# Patient Record
Sex: FEMALE | Race: WHITE | Employment: OTHER | URBAN - METROPOLITAN AREA
[De-identification: names, ages, dates, MRNs, and addresses within clinical notes are randomized per-mention and may not be internally consistent; named-entity substitution may affect disease eponyms.]

---

## 2022-02-10 ENCOUNTER — APPOINTMENT (OUTPATIENT)
Dept: GENERAL RADIOLOGY | Facility: CLINIC | Age: 67
DRG: 948 | End: 2022-02-10
Attending: EMERGENCY MEDICINE
Payer: MEDICARE

## 2022-02-10 ENCOUNTER — HOSPITAL ENCOUNTER (INPATIENT)
Facility: CLINIC | Age: 67
LOS: 1 days | Discharge: HOME HEALTH | DRG: 948 | End: 2022-02-12
Attending: EMERGENCY MEDICINE | Admitting: HOSPITALIST
Payer: MEDICARE

## 2022-02-10 DIAGNOSIS — S42.221A CLOSED 2-PART DISPLACED FRACTURE OF SURGICAL NECK OF RIGHT HUMERUS, INITIAL ENCOUNTER: ICD-10-CM

## 2022-02-10 DIAGNOSIS — S42.201A CLOSED FRACTURE OF PROXIMAL END OF RIGHT HUMERUS, UNSPECIFIED FRACTURE MORPHOLOGY, INITIAL ENCOUNTER: ICD-10-CM

## 2022-02-10 LAB
ANION GAP SERPL CALCULATED.3IONS-SCNC: 5 MMOL/L (ref 3–14)
BASOPHILS # BLD AUTO: 0 10E3/UL (ref 0–0.2)
BASOPHILS NFR BLD AUTO: 0 %
BUN SERPL-MCNC: 21 MG/DL (ref 7–30)
CALCIUM SERPL-MCNC: 8.6 MG/DL (ref 8.5–10.1)
CHLORIDE BLD-SCNC: 110 MMOL/L (ref 94–109)
CO2 SERPL-SCNC: 22 MMOL/L (ref 20–32)
CREAT SERPL-MCNC: 0.66 MG/DL (ref 0.52–1.04)
EOSINOPHIL # BLD AUTO: 0 10E3/UL (ref 0–0.7)
EOSINOPHIL NFR BLD AUTO: 0 %
ERYTHROCYTE [DISTWIDTH] IN BLOOD BY AUTOMATED COUNT: 17.2 % (ref 10–15)
GFR SERPL CREATININE-BSD FRML MDRD: >90 ML/MIN/1.73M2
GLUCOSE BLD-MCNC: 114 MG/DL (ref 70–99)
HCT VFR BLD AUTO: 42.7 % (ref 35–47)
HGB BLD-MCNC: 13.6 G/DL (ref 11.7–15.7)
IMM GRANULOCYTES # BLD: 0.1 10E3/UL
IMM GRANULOCYTES NFR BLD: 0 %
LYMPHOCYTES # BLD AUTO: 2.3 10E3/UL (ref 0.8–5.3)
LYMPHOCYTES NFR BLD AUTO: 20 %
MCH RBC QN AUTO: 27.8 PG (ref 26.5–33)
MCHC RBC AUTO-ENTMCNC: 31.9 G/DL (ref 31.5–36.5)
MCV RBC AUTO: 87 FL (ref 78–100)
MONOCYTES # BLD AUTO: 0.8 10E3/UL (ref 0–1.3)
MONOCYTES NFR BLD AUTO: 7 %
NEUTROPHILS # BLD AUTO: 8.3 10E3/UL (ref 1.6–8.3)
NEUTROPHILS NFR BLD AUTO: 73 %
NRBC # BLD AUTO: 0 10E3/UL
NRBC BLD AUTO-RTO: 0 /100
PLATELET # BLD AUTO: 242 10E3/UL (ref 150–450)
POTASSIUM BLD-SCNC: 4.3 MMOL/L (ref 3.4–5.3)
RBC # BLD AUTO: 4.89 10E6/UL (ref 3.8–5.2)
SARS-COV-2 RNA RESP QL NAA+PROBE: NEGATIVE
SODIUM SERPL-SCNC: 137 MMOL/L (ref 133–144)
WBC # BLD AUTO: 11.5 10E3/UL (ref 4–11)

## 2022-02-10 PROCEDURE — 250N000011 HC RX IP 250 OP 636: Performed by: EMERGENCY MEDICINE

## 2022-02-10 PROCEDURE — 96375 TX/PRO/DX INJ NEW DRUG ADDON: CPT

## 2022-02-10 PROCEDURE — G0378 HOSPITAL OBSERVATION PER HR: HCPCS

## 2022-02-10 PROCEDURE — 82310 ASSAY OF CALCIUM: CPT | Performed by: EMERGENCY MEDICINE

## 2022-02-10 PROCEDURE — 93005 ELECTROCARDIOGRAM TRACING: CPT

## 2022-02-10 PROCEDURE — 250N000013 HC RX MED GY IP 250 OP 250 PS 637: Performed by: PHYSICIAN ASSISTANT

## 2022-02-10 PROCEDURE — 250N000013 HC RX MED GY IP 250 OP 250 PS 637: Performed by: EMERGENCY MEDICINE

## 2022-02-10 PROCEDURE — C9803 HOPD COVID-19 SPEC COLLECT: HCPCS

## 2022-02-10 PROCEDURE — 73060 X-RAY EXAM OF HUMERUS: CPT | Mod: RT

## 2022-02-10 PROCEDURE — 36415 COLL VENOUS BLD VENIPUNCTURE: CPT | Performed by: EMERGENCY MEDICINE

## 2022-02-10 PROCEDURE — 99220 PR INITIAL OBSERVATION CARE,LEVEL III: CPT | Performed by: PHYSICIAN ASSISTANT

## 2022-02-10 PROCEDURE — 87635 SARS-COV-2 COVID-19 AMP PRB: CPT | Performed by: EMERGENCY MEDICINE

## 2022-02-10 PROCEDURE — 85025 COMPLETE CBC W/AUTO DIFF WBC: CPT | Performed by: EMERGENCY MEDICINE

## 2022-02-10 PROCEDURE — 99285 EMERGENCY DEPT VISIT HI MDM: CPT | Mod: 25

## 2022-02-10 PROCEDURE — 96374 THER/PROPH/DIAG INJ IV PUSH: CPT

## 2022-02-10 PROCEDURE — 96376 TX/PRO/DX INJ SAME DRUG ADON: CPT

## 2022-02-10 RX ORDER — HYDROMORPHONE HCL IN WATER/PF 6 MG/30 ML
0.2 PATIENT CONTROLLED ANALGESIA SYRINGE INTRAVENOUS
Status: DISCONTINUED | OUTPATIENT
Start: 2022-02-10 | End: 2022-02-11

## 2022-02-10 RX ORDER — ONDANSETRON 2 MG/ML
4 INJECTION INTRAMUSCULAR; INTRAVENOUS ONCE
Status: COMPLETED | OUTPATIENT
Start: 2022-02-10 | End: 2022-02-10

## 2022-02-10 RX ORDER — ONDANSETRON 4 MG/1
4 TABLET, ORALLY DISINTEGRATING ORAL EVERY 6 HOURS PRN
Status: DISCONTINUED | OUTPATIENT
Start: 2022-02-10 | End: 2022-02-12 | Stop reason: HOSPADM

## 2022-02-10 RX ORDER — HYDROMORPHONE HYDROCHLORIDE 1 MG/ML
0.5 INJECTION, SOLUTION INTRAMUSCULAR; INTRAVENOUS; SUBCUTANEOUS
Status: COMPLETED | OUTPATIENT
Start: 2022-02-10 | End: 2022-02-10

## 2022-02-10 RX ORDER — OMEPRAZOLE 40 MG/1
40 CAPSULE, DELAYED RELEASE ORAL DAILY
COMMUNITY

## 2022-02-10 RX ORDER — ASPIRIN 81 MG/1
81 TABLET ORAL DAILY
COMMUNITY

## 2022-02-10 RX ORDER — HYDROMORPHONE HYDROCHLORIDE 1 MG/ML
0.5 INJECTION, SOLUTION INTRAMUSCULAR; INTRAVENOUS; SUBCUTANEOUS
Status: DISCONTINUED | OUTPATIENT
Start: 2022-02-10 | End: 2022-02-10

## 2022-02-10 RX ORDER — GABAPENTIN 300 MG/1
300 CAPSULE ORAL 2 TIMES DAILY
Status: ON HOLD | COMMUNITY
End: 2022-02-12

## 2022-02-10 RX ORDER — ACETAMINOPHEN 325 MG/1
975 TABLET ORAL EVERY 8 HOURS
Status: DISCONTINUED | OUTPATIENT
Start: 2022-02-10 | End: 2022-02-11

## 2022-02-10 RX ORDER — OXYCODONE AND ACETAMINOPHEN 5; 325 MG/1; MG/1
1 TABLET ORAL ONCE
Status: COMPLETED | OUTPATIENT
Start: 2022-02-10 | End: 2022-02-10

## 2022-02-10 RX ORDER — LISINOPRIL 40 MG/1
20 TABLET ORAL DAILY
COMMUNITY

## 2022-02-10 RX ORDER — DULOXETIN HYDROCHLORIDE 60 MG/1
60 CAPSULE, DELAYED RELEASE ORAL AT BEDTIME
COMMUNITY

## 2022-02-10 RX ORDER — ATORVASTATIN CALCIUM 40 MG/1
40 TABLET, FILM COATED ORAL AT BEDTIME
COMMUNITY

## 2022-02-10 RX ORDER — METOPROLOL SUCCINATE 200 MG/1
200 TABLET, EXTENDED RELEASE ORAL DAILY
COMMUNITY

## 2022-02-10 RX ORDER — AMOXICILLIN 250 MG
2 CAPSULE ORAL 2 TIMES DAILY
Status: DISCONTINUED | OUTPATIENT
Start: 2022-02-10 | End: 2022-02-12 | Stop reason: HOSPADM

## 2022-02-10 RX ORDER — OXYCODONE HYDROCHLORIDE 5 MG/1
10 TABLET ORAL EVERY 4 HOURS PRN
Status: DISCONTINUED | OUTPATIENT
Start: 2022-02-10 | End: 2022-02-11

## 2022-02-10 RX ORDER — MULTIPLE VITAMINS W/ MINERALS TAB 9MG-400MCG
1 TAB ORAL DAILY
COMMUNITY

## 2022-02-10 RX ORDER — OXYBUTYNIN CHLORIDE 5 MG/1
5 TABLET ORAL AT BEDTIME
COMMUNITY

## 2022-02-10 RX ORDER — AMOXICILLIN 250 MG
1 CAPSULE ORAL 2 TIMES DAILY
Status: DISCONTINUED | OUTPATIENT
Start: 2022-02-10 | End: 2022-02-12 | Stop reason: HOSPADM

## 2022-02-10 RX ORDER — ONDANSETRON 2 MG/ML
4 INJECTION INTRAMUSCULAR; INTRAVENOUS EVERY 6 HOURS PRN
Status: DISCONTINUED | OUTPATIENT
Start: 2022-02-10 | End: 2022-02-12 | Stop reason: HOSPADM

## 2022-02-10 RX ORDER — HYDROXYZINE HYDROCHLORIDE 25 MG/1
25 TABLET, FILM COATED ORAL 3 TIMES DAILY
Status: DISCONTINUED | OUTPATIENT
Start: 2022-02-10 | End: 2022-02-11

## 2022-02-10 RX ORDER — METHOCARBAMOL 750 MG/1
750 TABLET, FILM COATED ORAL 4 TIMES DAILY
Status: DISCONTINUED | OUTPATIENT
Start: 2022-02-10 | End: 2022-02-11

## 2022-02-10 RX ORDER — CHLORAL HYDRATE 500 MG
1 CAPSULE ORAL DAILY
COMMUNITY

## 2022-02-10 RX ADMIN — HYDROMORPHONE HYDROCHLORIDE 0.5 MG: 1 INJECTION, SOLUTION INTRAMUSCULAR; INTRAVENOUS; SUBCUTANEOUS at 14:21

## 2022-02-10 RX ADMIN — HYDROXYZINE HYDROCHLORIDE 25 MG: 25 TABLET, FILM COATED ORAL at 21:19

## 2022-02-10 RX ADMIN — METHOCARBAMOL 750 MG: 750 TABLET ORAL at 21:20

## 2022-02-10 RX ADMIN — SENNOSIDES AND DOCUSATE SODIUM 1 TABLET: 50; 8.6 TABLET ORAL at 21:27

## 2022-02-10 RX ADMIN — HYDROMORPHONE HYDROCHLORIDE 0.5 MG: 1 INJECTION, SOLUTION INTRAMUSCULAR; INTRAVENOUS; SUBCUTANEOUS at 12:20

## 2022-02-10 RX ADMIN — HYDROMORPHONE HYDROCHLORIDE 0.5 MG: 1 INJECTION, SOLUTION INTRAMUSCULAR; INTRAVENOUS; SUBCUTANEOUS at 17:52

## 2022-02-10 RX ADMIN — HYDROMORPHONE HYDROCHLORIDE 0.5 MG: 1 INJECTION, SOLUTION INTRAMUSCULAR; INTRAVENOUS; SUBCUTANEOUS at 13:12

## 2022-02-10 RX ADMIN — ACETAMINOPHEN 975 MG: 325 TABLET, FILM COATED ORAL at 21:19

## 2022-02-10 RX ADMIN — ONDANSETRON 4 MG: 2 INJECTION INTRAMUSCULAR; INTRAVENOUS at 13:12

## 2022-02-10 RX ADMIN — OXYCODONE HYDROCHLORIDE AND ACETAMINOPHEN 1 TABLET: 5; 325 TABLET ORAL at 16:19

## 2022-02-10 NOTE — ED NOTES
"[facility]  ED Nurse Handoff Report    Kristan Trinidad is a 67 year old female   ED Chief complaint: Fall  . ED Diagnosis:   Final diagnoses:   None     Allergies:   Allergies   Allergen Reactions     Codeine Nausea and Vomiting     Fentanyl Hives       Code Status: Full Code  Activity level - Baseline/Home:  Independent. Activity Level - Current:   Unable to Assess, patient able to get to sitting at edge of bed but unable to stand due to arm pain. Lift room needed: No. Bariatric: No   Needed: No   Isolation: No. Infection: Not Applicable.     Vital Signs:   Vitals:    02/10/22 1330 02/10/22 1345 02/10/22 1415 02/10/22 1430   BP: (!) 146/78 (!) 144/81 118/75 138/80   Pulse: 70 70 69 70   Resp:       Temp:       TempSrc:       SpO2: 96% 91% 98% 99%       Cardiac Rhythm:  ,      Pain level:    Patient confused: No. Patient Falls Risk: Yes.   Elimination Status: Unable to void   Patient Report - Initial Complaint: Fall. Focused Assessment: Patient arrives via EMS after fall on ice onto concrete. Family unable to get patient off ground. Patient repeatedly stating \"oh that hurts, don't move me.\" ABCs intact, alert and orientated.   Tests Performed:   Humerus XR, G/E 2 views, right   Final Result   IMPRESSION: Mildly displaced right proximal humerus fracture centered   at the humeral neck. Mid to distal right humerus appears intact. No   joint malalignment. Lateral downsloping of the right acromion with   mild right acromioclavicular joint osteoarthritis. No sizable elbow   joint effusion.       BENJI CHE MD            SYSTEM ID:  [id number]        Labs Ordered and Resulted from Time of ED Arrival to Time of ED Departure   BASIC METABOLIC PANEL - Abnormal       Result Value    Sodium 137      Potassium 4.3      Chloride 110 (*)     Carbon Dioxide (CO2) 22      Anion Gap 5      Urea Nitrogen 21      Creatinine 0.66      Calcium 8.6      Glucose 114 (*)     GFR Estimate >90     CBC WITH " PLATELETS AND DIFFERENTIAL - Abnormal    WBC Count 11.5 (*)     RBC Count 4.89      Hemoglobin 13.6      Hematocrit 42.7      MCV 87      MCH 27.8      MCHC 31.9      RDW 17.2 (*)     Platelet Count 242      % Neutrophils 73      % Lymphocytes 20      % Monocytes 7      % Eosinophils 0      % Basophils 0      % Immature Granulocytes 0      NRBCs per 100 WBC 0      Absolute Neutrophils 8.3      Absolute Lymphocytes 2.3      Absolute Monocytes 0.8      Absolute Eosinophils 0.0      Absolute Basophils 0.0      Absolute Immature Granulocytes 0.1      Absolute NRBCs 0.0       Abnormal Results:   Abnormal Labs Resulted from Time of ED Arrival to Time of ED Departure   BASIC METABOLIC PANEL - Abnormal       Result Value    Sodium 137      Potassium 4.3      Chloride 110 (*)     Carbon Dioxide (CO2) 22      Anion Gap 5      Urea Nitrogen 21      Creatinine 0.66      Calcium 8.6      Glucose 114 (*)     GFR Estimate >90     CBC WITH PLATELETS AND DIFFERENTIAL - Abnormal    WBC Count 11.5 (*)     RBC Count 4.89      Hemoglobin 13.6      Hematocrit 42.7      MCV 87      MCH 27.8      MCHC 31.9      RDW 17.2 (*)     Platelet Count 242      % Neutrophils 73      % Lymphocytes 20      % Monocytes 7      % Eosinophils 0      % Basophils 0      % Immature Granulocytes 0      NRBCs per 100 WBC 0      Absolute Neutrophils 8.3      Absolute Lymphocytes 2.3      Absolute Monocytes 0.8      Absolute Eosinophils 0.0      Absolute Basophils 0.0      Absolute Immature Granulocytes 0.1      Absolute NRBCs 0.0       Treatments provided: labs, imaging, pain meds  Family Comments: daughter present at bedside with patient  OBS brochure/video discussed/provided to patient:  Yes  ED Medications:   Medications   oxyCODONE-acetaminophen (PERCOCET) 5-325 MG per tablet 1 tablet (has no administration in time range)   HYDROmorphone (PF) (DILAUDID) injection 0.5 mg (0.5 mg Intravenous Given 2/10/22 8776)   ondansetron (ZOFRAN) injection 4 mg (4 mg  Intravenous Given 2/10/22 1312)     Drips infusing:  No  For the majority of the shift, the patient's behavior Yellow, screaming out in pain with any type of movement. Interventions performed were pain medications, allowed time for processing of instructions, as well as making a clear plan and take it step by step.    Sepsis treatment initiated: No     Patient tested for COVID 19 prior to admission: YES, pending results    ED Nurse Name/Phone Number: Phyllis Phelps RN,   2:54 PM  RECEIVING UNIT ED HANDOFF REVIEW    Above ED Nurse Handoff Report was reviewed: Yes  Reviewed by: Jade Barreto RN on February 10, 2022 at 11:47 PM

## 2022-02-10 NOTE — ED NOTES
Bed: ED20  Expected date: 2/10/22  Expected time:   Means of arrival:   Comments:  Gail 592::67f fall, arm pain

## 2022-02-10 NOTE — ED TRIAGE NOTES
"Patient arrives via EMS after fall on ice onto concrete. Family unable to get patient off ground. Patient repeatedly stating \"oh that hurts, don't move me.\" ABCs intact, alert and orientated.  "

## 2022-02-10 NOTE — ED NOTES
Attempted to help pt stand up. Pt was able to sit on side of bed with minimal assist but refused to try to stand due to pain.

## 2022-02-10 NOTE — ED PROVIDER NOTES
History     Chief Complaint:  Fall       HPI   Kristan Trinidad is a 67 year old female who presents with right arm pain after a fall.  The patient slipped on some ice outside, landing on her right arm.  She has mid humerus pain.  She does endorse some numbness and tingling in her right fingers but is able to move them.  She denies hitting her head or other trauma.      ROS:  Review of Systems  Positive-right arm pain, numbness/tingling in fingers  Negative-head trauma  Remaining pertinent 10 point ROS negative    Allergies:  Codeine  Fentanyl     Medications:    No current outpatient medications on file.      Past Medical History:    No past medical history on file.  There is no problem list on file for this patient.       Past Surgical History:    No past surgical history on file.     Family History:    family history is not on file.    Social History:     PCP: No primary care provider on file.     Physical Exam     Patient Vitals for the past 24 hrs:   BP Temp Temp src Pulse Resp SpO2   02/10/22 1430 138/80 -- -- 70 -- 99 %   02/10/22 1415 118/75 -- -- 69 -- 98 %   02/10/22 1345 (!) 144/81 -- -- 70 -- 91 %   02/10/22 1330 (!) 146/78 -- -- 70 -- 96 %   02/10/22 1315 (!) 149/72 -- -- 70 -- 92 %   02/10/22 1300 134/81 -- -- 70 -- 92 %   02/10/22 1245 120/67 97.4  F (36.3  C) Oral 70 16 96 %        Physical Exam  General/Appearance: appears stated age, well-groomed, appears in significant pain  Eyes: EOMI, no scleral injection, no icterus  ENT: MMM  Neck: supple, nl ROM, no stiffness  Cardiovascular: RRR, nl S1S2, no m/r/g, 2+ pulses in all 4 extremities, cap refill <2sec  Respiratory: CTAB, good air movement throughout, no wheezes/rhonchi/rales, no increased WOB, no retractions  MSK: pain and fullness to mid R humerus with ttp, able to move R digits  Skin: warm and well-perfused, no rash, no edema, no ecchymosis, nl turgor  Neuro: GCS 15, alert and oriented, no gross focal neuro deficits - nl sensation to R  hand  Heme: no petechia, no purpura, no active bleeding        Emergency Department Course   ECG:  AV dual paced rhythm  Ventricular rate 66  VT interval 106    QTc 568    Imaging:  Humerus XR, G/E 2 views, right   Final Result   IMPRESSION: Mildly displaced right proximal humerus fracture centered   at the humeral neck. Mid to distal right humerus appears intact. No   joint malalignment. Lateral downsloping of the right acromion with   mild right acromioclavicular joint osteoarthritis. No sizable elbow   joint effusion.       BENJI CHE MD            SYSTEM ID:  [id number]         Report per radiology    Laboratory:  Labs Ordered and Resulted from Time of ED Arrival to Time of ED Departure   BASIC METABOLIC PANEL - Abnormal       Result Value    Sodium 137      Potassium 4.3      Chloride 110 (*)     Carbon Dioxide (CO2) 22      Anion Gap 5      Urea Nitrogen 21      Creatinine 0.66      Calcium 8.6      Glucose 114 (*)     GFR Estimate >90     CBC WITH PLATELETS AND DIFFERENTIAL - Abnormal    WBC Count 11.5 (*)     RBC Count 4.89      Hemoglobin 13.6      Hematocrit 42.7      MCV 87      MCH 27.8      MCHC 31.9      RDW 17.2 (*)     Platelet Count 242      % Neutrophils 73      % Lymphocytes 20      % Monocytes 7      % Eosinophils 0      % Basophils 0      % Immature Granulocytes 0      NRBCs per 100 WBC 0      Absolute Neutrophils 8.3      Absolute Lymphocytes 2.3      Absolute Monocytes 0.8      Absolute Eosinophils 0.0      Absolute Basophils 0.0      Absolute Immature Granulocytes 0.1      Absolute NRBCs 0.0          Emergency Department Course:    Reviewed:  I reviewed nursing notes and vitals    Assessments:   I obtained history and examined the patient as noted above.   1400 I rechecked the patient and explained findings.   1450 Pt unable to ambulate 2/2 pain. Will bring into Obs.      Consults:   1505 Spoke with Ortho  1505  Spoke with PA for Medicine    Interventions:  Medications    oxyCODONE-acetaminophen (PERCOCET) 5-325 MG per tablet 1 tablet (has no administration in time range)   HYDROmorphone (PF) (DILAUDID) injection 0.5 mg (0.5 mg Intravenous Given 2/10/22 1421)   ondansetron (ZOFRAN) injection 4 mg (4 mg Intravenous Given 2/10/22 1312)        Disposition:  The patient was admitted to the hospital under the care of Dr. Humphrey.     Impression & Plan      Medical Decision Making:  This patient is a 67-year-old female, right-handed, who presents after mechanical fall.  She has humerus pain and on x-ray has a surgical humeral neck fracture.  She is got good neurovascular status distal to the injury.  We attempted to put her in a right arm sling and have her ambulate but unfortunately we are unable to get her pain controlled enough to the point where she is able to ambulate.  This is despite 3 doses of Dilaudid and p.o. medication.  Because of this we will bring her into observation overnight for further attempts at symptom control.  Of note I did speak with orthopedics and they agree that this is typically not a surgical fracture but they will keep her on the list and consult while the patient is in the hospital.  Diagnosis:    ICD-10-CM    1. Closed 2-part displaced fracture of surgical neck of right humerus, initial encounter  S42.221A         Discharge Medications:  New Prescriptions    No medications on file        2/10/2022   Franac Ferrari*        Franca Ferrari MD  02/10/22 3010

## 2022-02-11 ENCOUNTER — APPOINTMENT (OUTPATIENT)
Dept: CT IMAGING | Facility: CLINIC | Age: 67
DRG: 948 | End: 2022-02-11
Attending: PHYSICIAN ASSISTANT
Payer: MEDICARE

## 2022-02-11 ENCOUNTER — APPOINTMENT (OUTPATIENT)
Dept: OCCUPATIONAL THERAPY | Facility: CLINIC | Age: 67
DRG: 948 | End: 2022-02-11
Attending: PHYSICIAN ASSISTANT
Payer: MEDICARE

## 2022-02-11 ENCOUNTER — APPOINTMENT (OUTPATIENT)
Dept: GENERAL RADIOLOGY | Facility: CLINIC | Age: 67
DRG: 948 | End: 2022-02-11
Attending: PHYSICIAN ASSISTANT
Payer: MEDICARE

## 2022-02-11 LAB
ALBUMIN SERPL-MCNC: 3.5 G/DL (ref 3.4–5)
ALP SERPL-CCNC: 61 U/L (ref 40–150)
ALT SERPL W P-5'-P-CCNC: 24 U/L (ref 0–50)
ANION GAP SERPL CALCULATED.3IONS-SCNC: 4 MMOL/L (ref 3–14)
AST SERPL W P-5'-P-CCNC: 16 U/L (ref 0–45)
ATRIAL RATE - MUSE: 66 BPM
BILIRUB DIRECT SERPL-MCNC: <0.1 MG/DL (ref 0–0.2)
BILIRUB SERPL-MCNC: 0.4 MG/DL (ref 0.2–1.3)
BUN SERPL-MCNC: 14 MG/DL (ref 7–30)
CALCIUM SERPL-MCNC: 8.7 MG/DL (ref 8.5–10.1)
CHLORIDE BLD-SCNC: 111 MMOL/L (ref 94–109)
CO2 SERPL-SCNC: 24 MMOL/L (ref 20–32)
CREAT SERPL-MCNC: 0.65 MG/DL (ref 0.52–1.04)
DIASTOLIC BLOOD PRESSURE - MUSE: NORMAL MMHG
ERYTHROCYTE [DISTWIDTH] IN BLOOD BY AUTOMATED COUNT: 17.2 % (ref 10–15)
GFR SERPL CREATININE-BSD FRML MDRD: >90 ML/MIN/1.73M2
GLUCOSE BLD-MCNC: 115 MG/DL (ref 70–99)
HCT VFR BLD AUTO: 44.1 % (ref 35–47)
HGB BLD-MCNC: 13.8 G/DL (ref 11.7–15.7)
INTERPRETATION ECG - MUSE: NORMAL
MCH RBC QN AUTO: 27.8 PG (ref 26.5–33)
MCHC RBC AUTO-ENTMCNC: 31.3 G/DL (ref 31.5–36.5)
MCV RBC AUTO: 89 FL (ref 78–100)
P AXIS - MUSE: 101 DEGREES
PLATELET # BLD AUTO: 230 10E3/UL (ref 150–450)
POTASSIUM BLD-SCNC: 4.1 MMOL/L (ref 3.4–5.3)
PR INTERVAL - MUSE: 106 MS
PROT SERPL-MCNC: 7.1 G/DL (ref 6.8–8.8)
QRS DURATION - MUSE: 162 MS
QT - MUSE: 542 MS
QTC - MUSE: 568 MS
R AXIS - MUSE: -83 DEGREES
RBC # BLD AUTO: 4.97 10E6/UL (ref 3.8–5.2)
SODIUM SERPL-SCNC: 139 MMOL/L (ref 133–144)
SYSTOLIC BLOOD PRESSURE - MUSE: NORMAL MMHG
T AXIS - MUSE: 89 DEGREES
VENTRICULAR RATE- MUSE: 66 BPM
WBC # BLD AUTO: 11.2 10E3/UL (ref 4–11)

## 2022-02-11 PROCEDURE — 73200 CT UPPER EXTREMITY W/O DYE: CPT | Mod: RT

## 2022-02-11 PROCEDURE — G0378 HOSPITAL OBSERVATION PER HR: HCPCS

## 2022-02-11 PROCEDURE — 258N000003 HC RX IP 258 OP 636: Performed by: PHYSICIAN ASSISTANT

## 2022-02-11 PROCEDURE — 97165 OT EVAL LOW COMPLEX 30 MIN: CPT | Mod: GO | Performed by: OCCUPATIONAL THERAPIST

## 2022-02-11 PROCEDURE — 73020 X-RAY EXAM OF SHOULDER: CPT | Mod: RT

## 2022-02-11 PROCEDURE — 250N000011 HC RX IP 250 OP 636: Performed by: PHYSICIAN ASSISTANT

## 2022-02-11 PROCEDURE — 250N000013 HC RX MED GY IP 250 OP 250 PS 637: Performed by: PHYSICIAN ASSISTANT

## 2022-02-11 PROCEDURE — 99233 SBSQ HOSP IP/OBS HIGH 50: CPT | Performed by: PHYSICIAN ASSISTANT

## 2022-02-11 PROCEDURE — 96376 TX/PRO/DX INJ SAME DRUG ADON: CPT

## 2022-02-11 PROCEDURE — 85027 COMPLETE CBC AUTOMATED: CPT | Performed by: PHYSICIAN ASSISTANT

## 2022-02-11 PROCEDURE — 82248 BILIRUBIN DIRECT: CPT | Performed by: PHYSICIAN ASSISTANT

## 2022-02-11 PROCEDURE — 97530 THERAPEUTIC ACTIVITIES: CPT | Mod: GO | Performed by: OCCUPATIONAL THERAPIST

## 2022-02-11 PROCEDURE — 120N000004 HC R&B MS OVERFLOW

## 2022-02-11 PROCEDURE — 36415 COLL VENOUS BLD VENIPUNCTURE: CPT | Performed by: PHYSICIAN ASSISTANT

## 2022-02-11 PROCEDURE — 82374 ASSAY BLOOD CARBON DIOXIDE: CPT | Performed by: PHYSICIAN ASSISTANT

## 2022-02-11 RX ORDER — HYDROMORPHONE HCL IN WATER/PF 6 MG/30 ML
.5-1 PATIENT CONTROLLED ANALGESIA SYRINGE INTRAVENOUS
Status: DISCONTINUED | OUTPATIENT
Start: 2022-02-11 | End: 2022-02-12 | Stop reason: HOSPADM

## 2022-02-11 RX ORDER — NALOXONE HYDROCHLORIDE 0.4 MG/ML
0.4 INJECTION, SOLUTION INTRAMUSCULAR; INTRAVENOUS; SUBCUTANEOUS
Status: DISCONTINUED | OUTPATIENT
Start: 2022-02-11 | End: 2022-02-12 | Stop reason: HOSPADM

## 2022-02-11 RX ORDER — LISINOPRIL 20 MG/1
20 TABLET ORAL DAILY
Status: DISCONTINUED | OUTPATIENT
Start: 2022-02-11 | End: 2022-02-12 | Stop reason: HOSPADM

## 2022-02-11 RX ORDER — GABAPENTIN 300 MG/1
300 CAPSULE ORAL 2 TIMES DAILY
Status: DISCONTINUED | OUTPATIENT
Start: 2022-02-11 | End: 2022-02-11

## 2022-02-11 RX ORDER — POLYETHYLENE GLYCOL 3350 17 G/17G
17 POWDER, FOR SOLUTION ORAL DAILY
Status: DISCONTINUED | OUTPATIENT
Start: 2022-02-11 | End: 2022-02-12 | Stop reason: HOSPADM

## 2022-02-11 RX ORDER — ATORVASTATIN CALCIUM 40 MG/1
40 TABLET, FILM COATED ORAL AT BEDTIME
Status: DISCONTINUED | OUTPATIENT
Start: 2022-02-11 | End: 2022-02-12 | Stop reason: HOSPADM

## 2022-02-11 RX ORDER — OXYCODONE HYDROCHLORIDE 5 MG/1
5-10 TABLET ORAL EVERY 4 HOURS PRN
Status: DISCONTINUED | OUTPATIENT
Start: 2022-02-11 | End: 2022-02-11

## 2022-02-11 RX ORDER — IBUPROFEN 600 MG/1
600 TABLET, FILM COATED ORAL 4 TIMES DAILY PRN
Status: DISCONTINUED | OUTPATIENT
Start: 2022-02-11 | End: 2022-02-12 | Stop reason: HOSPADM

## 2022-02-11 RX ORDER — HYDROXYZINE HYDROCHLORIDE 25 MG/1
25 TABLET, FILM COATED ORAL EVERY 6 HOURS PRN
Status: DISCONTINUED | OUTPATIENT
Start: 2022-02-11 | End: 2022-02-12 | Stop reason: HOSPADM

## 2022-02-11 RX ORDER — ACETAMINOPHEN 500 MG
1000 TABLET ORAL EVERY 8 HOURS
Status: DISCONTINUED | OUTPATIENT
Start: 2022-02-11 | End: 2022-02-12 | Stop reason: HOSPADM

## 2022-02-11 RX ORDER — DULOXETIN HYDROCHLORIDE 60 MG/1
60 CAPSULE, DELAYED RELEASE ORAL AT BEDTIME
Status: DISCONTINUED | OUTPATIENT
Start: 2022-02-11 | End: 2022-02-12 | Stop reason: HOSPADM

## 2022-02-11 RX ORDER — SODIUM CHLORIDE, SODIUM LACTATE, POTASSIUM CHLORIDE, CALCIUM CHLORIDE 600; 310; 30; 20 MG/100ML; MG/100ML; MG/100ML; MG/100ML
INJECTION, SOLUTION INTRAVENOUS CONTINUOUS
Status: DISCONTINUED | OUTPATIENT
Start: 2022-02-11 | End: 2022-02-12 | Stop reason: HOSPADM

## 2022-02-11 RX ORDER — NALOXONE HYDROCHLORIDE 0.4 MG/ML
0.2 INJECTION, SOLUTION INTRAMUSCULAR; INTRAVENOUS; SUBCUTANEOUS
Status: DISCONTINUED | OUTPATIENT
Start: 2022-02-11 | End: 2022-02-12 | Stop reason: HOSPADM

## 2022-02-11 RX ORDER — HYDROXYZINE HYDROCHLORIDE 50 MG/1
50 TABLET, FILM COATED ORAL EVERY 6 HOURS PRN
Status: DISCONTINUED | OUTPATIENT
Start: 2022-02-11 | End: 2022-02-12 | Stop reason: HOSPADM

## 2022-02-11 RX ORDER — KETOROLAC TROMETHAMINE 15 MG/ML
15 INJECTION, SOLUTION INTRAMUSCULAR; INTRAVENOUS EVERY 6 HOURS PRN
Status: DISCONTINUED | OUTPATIENT
Start: 2022-02-11 | End: 2022-02-12 | Stop reason: HOSPADM

## 2022-02-11 RX ORDER — HYDROMORPHONE HYDROCHLORIDE 2 MG/1
2-4 TABLET ORAL
Status: DISCONTINUED | OUTPATIENT
Start: 2022-02-11 | End: 2022-02-12 | Stop reason: HOSPADM

## 2022-02-11 RX ORDER — BISACODYL 10 MG
10 SUPPOSITORY, RECTAL RECTAL DAILY PRN
Status: DISCONTINUED | OUTPATIENT
Start: 2022-02-11 | End: 2022-02-12 | Stop reason: HOSPADM

## 2022-02-11 RX ORDER — OXYBUTYNIN CHLORIDE 5 MG/1
5 TABLET ORAL AT BEDTIME
Status: DISCONTINUED | OUTPATIENT
Start: 2022-02-11 | End: 2022-02-12 | Stop reason: HOSPADM

## 2022-02-11 RX ORDER — METOPROLOL SUCCINATE 100 MG/1
200 TABLET, EXTENDED RELEASE ORAL DAILY
Status: DISCONTINUED | OUTPATIENT
Start: 2022-02-11 | End: 2022-02-12 | Stop reason: HOSPADM

## 2022-02-11 RX ORDER — GABAPENTIN 300 MG/1
600 CAPSULE ORAL 2 TIMES DAILY
Status: DISCONTINUED | OUTPATIENT
Start: 2022-02-11 | End: 2022-02-12

## 2022-02-11 RX ADMIN — ATORVASTATIN CALCIUM 40 MG: 40 TABLET, FILM COATED ORAL at 22:05

## 2022-02-11 RX ADMIN — ACETAMINOPHEN 1000 MG: 500 TABLET, FILM COATED ORAL at 19:45

## 2022-02-11 RX ADMIN — HYDROMORPHONE HYDROCHLORIDE 0.2 MG: 0.2 INJECTION, SOLUTION INTRAMUSCULAR; INTRAVENOUS; SUBCUTANEOUS at 07:04

## 2022-02-11 RX ADMIN — TIZANIDINE 8 MG: 4 TABLET ORAL at 19:45

## 2022-02-11 RX ADMIN — HYDROMORPHONE HYDROCHLORIDE 4 MG: 2 TABLET ORAL at 16:53

## 2022-02-11 RX ADMIN — OMEPRAZOLE 40 MG: 20 CAPSULE, DELAYED RELEASE ORAL at 08:08

## 2022-02-11 RX ADMIN — METOPROLOL SUCCINATE 200 MG: 100 TABLET, EXTENDED RELEASE ORAL at 08:08

## 2022-02-11 RX ADMIN — SODIUM CHLORIDE, POTASSIUM CHLORIDE, SODIUM LACTATE AND CALCIUM CHLORIDE: 600; 310; 30; 20 INJECTION, SOLUTION INTRAVENOUS at 11:52

## 2022-02-11 RX ADMIN — TIZANIDINE 4 MG: 4 TABLET ORAL at 14:04

## 2022-02-11 RX ADMIN — OXYBUTYNIN CHLORIDE 5 MG: 5 TABLET ORAL at 22:05

## 2022-02-11 RX ADMIN — GABAPENTIN 600 MG: 300 CAPSULE ORAL at 19:46

## 2022-02-11 RX ADMIN — HYDROXYZINE HYDROCHLORIDE 50 MG: 50 TABLET ORAL at 16:53

## 2022-02-11 RX ADMIN — DULOXETINE 60 MG: 60 CAPSULE, DELAYED RELEASE ORAL at 22:05

## 2022-02-11 RX ADMIN — OXYCODONE HYDROCHLORIDE 10 MG: 5 TABLET ORAL at 05:43

## 2022-02-11 RX ADMIN — HYDROMORPHONE HYDROCHLORIDE 0.2 MG: 0.2 INJECTION, SOLUTION INTRAMUSCULAR; INTRAVENOUS; SUBCUTANEOUS at 03:03

## 2022-02-11 RX ADMIN — DULOXETINE 60 MG: 60 CAPSULE, DELAYED RELEASE ORAL at 00:24

## 2022-02-11 RX ADMIN — GABAPENTIN 300 MG: 300 CAPSULE ORAL at 08:08

## 2022-02-11 RX ADMIN — OXYBUTYNIN CHLORIDE 5 MG: 5 TABLET ORAL at 00:24

## 2022-02-11 RX ADMIN — KETOROLAC TROMETHAMINE 15 MG: 15 INJECTION, SOLUTION INTRAMUSCULAR; INTRAVENOUS at 11:07

## 2022-02-11 RX ADMIN — TIZANIDINE 4 MG: 4 TABLET ORAL at 08:09

## 2022-02-11 RX ADMIN — ACETAMINOPHEN 1000 MG: 500 TABLET, FILM COATED ORAL at 11:07

## 2022-02-11 RX ADMIN — HYDROMORPHONE HYDROCHLORIDE 4 MG: 2 TABLET ORAL at 22:05

## 2022-02-11 RX ADMIN — HYDROMORPHONE HYDROCHLORIDE 2 MG: 2 TABLET ORAL at 13:54

## 2022-02-11 RX ADMIN — ATORVASTATIN CALCIUM 40 MG: 40 TABLET, FILM COATED ORAL at 00:24

## 2022-02-11 RX ADMIN — HYDROXYZINE HYDROCHLORIDE 25 MG: 25 TABLET, FILM COATED ORAL at 08:08

## 2022-02-11 RX ADMIN — SENNOSIDES AND DOCUSATE SODIUM 1 TABLET: 50; 8.6 TABLET ORAL at 19:46

## 2022-02-11 RX ADMIN — HYDROMORPHONE HYDROCHLORIDE 0.2 MG: 0.2 INJECTION, SOLUTION INTRAMUSCULAR; INTRAVENOUS; SUBCUTANEOUS at 00:15

## 2022-02-11 RX ADMIN — KETOROLAC TROMETHAMINE 15 MG: 15 INJECTION, SOLUTION INTRAMUSCULAR; INTRAVENOUS at 16:52

## 2022-02-11 RX ADMIN — ACETAMINOPHEN 975 MG: 325 TABLET, FILM COATED ORAL at 03:02

## 2022-02-11 RX ADMIN — LISINOPRIL 20 MG: 20 TABLET ORAL at 08:09

## 2022-02-11 RX ADMIN — OXYCODONE HYDROCHLORIDE 10 MG: 5 TABLET ORAL at 01:06

## 2022-02-11 ASSESSMENT — ACTIVITIES OF DAILY LIVING (ADL)
ADLS_ACUITY_SCORE: 15
ADLS_ACUITY_SCORE: 11
ADLS_ACUITY_SCORE: 15
ADLS_ACUITY_SCORE: 15
ADLS_ACUITY_SCORE: 11
ADLS_ACUITY_SCORE: 15
ADLS_ACUITY_SCORE: 11
ADLS_ACUITY_SCORE: 15
ADLS_ACUITY_SCORE: 11
ADLS_ACUITY_SCORE: 15

## 2022-02-11 NOTE — PLAN OF CARE
PRIMARY DIAGNOSIS: R HUMERUS FX R/T FALL  OUTPATIENT/OBSERVATION GOALS TO BE MET BEFORE DISCHARGE:  1. Pain Status: Improved but still requiring IV narcotics.    2. Return to near baseline physical activity: No    3. Cleared for discharge by consultants (if involved): No    Discharge Planner Nurse   Safe discharge environment identified: Yes  Barriers to discharge: Yes       Entered by: Jade Barreto 02/11/2022 4:13 AM     Vitals are Temp: 98.3  F (36.8  C) Temp src: Oral BP: (!) 153/73 Pulse: 72   Resp: 20 SpO2: 92 %.    Patient is Alert and Oriented x4. Ax1 pivot to BSC.  Pt is a NPO diet.  Complaining of 10/10 pain in her R arm.  Dilaudid and Oxycodone given for pain with relief.  Patient is Saline locked. Ortho consulted.

## 2022-02-11 NOTE — PROGRESS NOTES
02/11/22 1516   Quick Adds   Type of Visit Initial Occupational Therapy Evaluation   Living Environment   People in home spouse   Current Living Arrangements house  (rambler home, no basement)   Home Accessibility stairs to enter home   Number of Stairs, Main Entrance 5   Stair Railings, Main Entrance railings on both sides of stairs   Transportation Anticipated family or friend will provide   Living Environment Comments spouse is on the road () 5 days a week.  Pt has a business restoring dolls.  Has a sister-in-law and adult children.  Not sure how much they could help in recovery   Self-Care   Usual Activity Tolerance good   Current Activity Tolerance fair   Regular Exercise No   Equipment Currently Used at Home none   Disability/Function   Hearing Difficulty or Deaf no   Wear Glasses or Blind yes   Vision Management glasses - cheaters   Concentrating, Remembering or Making Decisions Difficulty no   Difficulty Communicating no   Difficulty Eating/Swallowing no   Walking or Climbing Stairs Difficulty yes   Walking or Climbing Stairs ambulation difficulty, assistance 1 person;transferring difficulty, assistance 1 person   Dressing/Bathing Difficulty yes   Dressing/Bathing bathing difficulty, assistance 1 person;dressing difficulty, assistance 1 person;dressing difficulty, requires equipment   Toileting issues yes   Toileting Assistance toileting difficulty, assistance 1 person   Doing Errands Independently Difficulty (such as shopping) no   Fall history within last six months yes   Number of times patient has fallen within last six months 1   Change in Functional Status Since Onset of Current Illness/Injury yes   General Information   Onset of Illness/Injury or Date of Surgery 02/10/22   Referring Physician Alisa López PA-C   Patient/Family Therapy Goal Statement (OT) return home, open to rehab   Additional Occupational Profile Info/Pertinent History of Current Problem Pt is a 67 year old female  who was admitted with a mildly displace humerus after a fall and break.  No surgery is required at this time.  PMH includes hyperlipidemia, HTN, fibromyalgia, hypertrophic cardiomyopathy, sp, ICD   Performance Patterns (Routines, Roles, Habits) Pt was independent in ADLS and IADLS prior to her hopitalization   Existing Precautions/Restrictions fall;weight bearing   Right Upper Extremity (Weight-bearing Status) non weight-bearing (NWB)   General Observations and Info pt in bed, willing to participate.  Sleepy but responding   Cognitive Status Examination   Orientation Status orientation to person, place and time   Affect/Mental Status (Cognitive) WFL   Visual Perception   Visual Impairment/Limitations corrective lenses for reading   Pain Assessment   Patient Currently in Pain Yes, see Vital Sign flowsheet   Range of Motion Comprehensive   General Range of Motion upper extremity range of motion deficits identified   Comment, General Range of Motion left UE is WNL.  Right UE limited by pain and movement restrictions.  some movement of wrist/elbow with additional pain.  Will try to clarify movement orders going forward   Strength Comprehensive (MMT)   General Manual Muscle Testing (MMT) Assessment no strength deficits identified   Comment, General Manual Muscle Testing (MMT) Assessment right side not tested   Coordination   Upper Extremity Coordination No deficits were identified   Bed Mobility   Bed Mobility supine-sit;sit-supine   Supine-Sit [place] (Bed Mobility) verbal cues;moderate assist (50% patient effort)   Sit-Supine [place] (Bed Mobility) verbal cues;maximum assist (25% patient effort)   Clinical Impression   Criteria for Skilled Therapeutic Interventions Met (OT) yes   OT Diagnosis decreased independence with ADLS   OT Problem List-Impairments impacting ADL problems related to;activity tolerance impaired;balance;range of motion (ROM);strength;pain;post-surgical precautions   Assessment of  Occupational Performance 3-5 Performance Deficits   Identified Performance Deficits decreased independence for dressing, bathing, functional mobility, household chores, work chores   Planned Therapy Interventions (OT) ADL retraining;ROM;home program guidelines;progressive activity/exercise   Clinical Decision Making Complexity (OT) low complexity   Therapy Frequency (OT) Daily   Predicted Duration of Therapy 4 days   Risk & Benefits of therapy have been explained evaluation/treatment results reviewed;care plan/treatment goals reviewed;patient   OT Discharge Planning    OT Discharge Recommendation (DC Rec) Transitional Care Facility   OT Rationale for DC Rec Pt demonstrates overall weakness and pain which limits general movement out of bed as well as movement with her dominant right UE.  Pt is below baseline, at least Mod A with all ADLs and IADLS.  Would benefit from skilled OT to increase endurance and adaptive equipment including one handed ADLs.  Pt has limited help at home from spouse who works, is not sure how much help other family members can give now.   Total Evaluation Time (Minutes)   Total Evaluation Time (Minutes) 15

## 2022-02-11 NOTE — ED NOTES
Assist x2 to get pt up to bedside commode. Pt complains of severe pain during event. Pt coached on deep breathing. Pt able to urinate using bedside commode.

## 2022-02-11 NOTE — H&P
[facility]  Admission History and Physical Examination    NAME: Kristan Trinidad   : 1955   MRN: [id number]     Date of Admission:  2/10/2022    Assessment & Plan   Kristan Trinidad is a 67 year old female with PMH significant for hyperlipidemia, hypertension, fibromyalgia, hypertrophic cardiomyopathy status post ICD who presents with acute right arm pain after slipping on ice while going to lunch to celebrate her birthday today. Xray reveals right surgical neck fracture. She is being admitted for pain control.     #Rt humeral neck fx due to mechanical fall (right handed)   -  Will obtain Ortho consult, likely no surgical intervention indicated, But keep NPO after MN just in case  -  Pain control with multimodal pain control  -  Scheduled tylenol 1000 mg TID, robaxin QID, short time atarax, PRN oxycodone  -  Right arm sling for comfort   -  OT eval for mobility assessment    #Hx of HCM s/p ICD, follows with [facility]  #HTN/HLP  -  Last seen 2 weeks ago for device check.   -  No new issues  -  Cont PTA cardiac meds including HLP, Lisinopril, Metoprolol  -  Hold ASA for now but I doubt will need surgery    #Fibromyalgia   - Contributing to pain and pt is quite anxious   - Start multi-modal pain regiment as discussed above  -Continue gabapentin 300 mg 3 times daily  -Continue Cymbalta  -hold zanaflax as pt is on robaxin     Awaiting formal pharmacy reconciliation to resume home medications.     DVT Prophylaxis: Low Risk/Ambulatory with no VTE prophylaxis indicated  Code Status: Full Code  COVID PCR TESTING STATUS: Negative .   Family updated with plan of care: str lj at bedside     Expected Discharge:  1-2 days   Anticipated discharge location:  Awaiting care coordination huddle  Delays:       Alisa López PA-C    Primary Care Physician   YONIS DAS    Chief Complaint   Right humerus fx    History is obtained from the patient    Discussed with Dr. Hwang in the ED, full chart review  including lab work, imaging, and vital signs were reviewed.     History of Present Illness   Kristan Trinidad is a 67 year old female with PMH significant for hyperlipidemia, hypertension, fibromyalgia, hypertrophic cardiomyopathy status post ICD who presents with acute right arm pain after slipping on ice while going to lunch to celebrate her birthday today.   She did not hit her head. No recently illness. Been doing well from cardiac standpoint.     Xray reveals right surgical neck fracture. She is being admitted for pain control.     Past Medical History    I have reviewed this patient's medical history and updated it with pertinent information if needed.   HTN, HLP, HCM s/p ICD    Past Surgical History   I have reviewed this patient's surgical history and updated it with pertinent information if needed.    Prior to Admission Medications   None     Allergies   Allergies   Allergen Reactions     Codeine Nausea and Vomiting     Fentanyl Hives       Social History   I have reviewed this patient's social history and updated it with pertinent information if needed. Kristan Trinidad      Family History   I have reviewed this patient's family history and updated it with pertinent information if needed.     Review of Systems   The 10 point Review of Systems is negative other than noted in the HPI or here.     Physical Exam   Temp: 97.4  F (36.3  C) Temp src: Oral BP: 138/80 Pulse: 70   Resp: 16 SpO2: 99 % O2 Device: None (Room air)    Vital Signs with Ranges  Temp:  [97.4  F (36.3  C)] 97.4  F (36.3  C)  Pulse:  [69-70] 70  Resp:  [16] 16  BP: (118-149)/(67-81) 138/80  SpO2:  [91 %-99 %] 99 %  0 lbs 0 oz    Constitutional: Awake, alert,  Anxious .  Eyes: Conjunctiva and pupils examined and normal.  HEENT: Moist mucous membranes, normal dentition.  Respiratory: Clear to auscultation bilaterally, no crackles or wheezing.  Cardiovascular: Regular rate and rhythm, normal S1 and S2, and no murmur noted.  GI: Soft,  non-distended, non-tender, bowel sounds present. No rebound tenderness or guarding.  Lymph/Hematologic: No anterior cervical or supraclavicular adenopathy.  Skin: No rashes, no cyanosis, no edema.  Musculoskeletal: No deformities noted.  No erythema or tenderness. Moving all extremities. CMS intact, right arm in sling, strong hand    Neurologic: No focal deficits noted. Speech is clear. Coordination and strength grossly normal.   Psychiatric: Appropriate affect.    Data   Data reviewed today:      EKG: AV dual paced  Imaging:   Recent Results (from the past 24 hour(s))   Humerus XR, G/E 2 views, right    Narrative    RIGHT HUMERUS TWO OR MORE VIEWS  2/10/2022 12:41 PM    INDICATION: Fall, midhumerus pain.    COMPARISON: None available.      Impression    IMPRESSION: Mildly displaced right proximal humerus fracture centered  at the humeral neck. Mid to distal right humerus appears intact. No  joint malalignment. Lateral downsloping of the right acromion with  mild right acromioclavicular joint osteoarthritis. No sizable elbow  joint effusion.     BENJI CHE MD         SYSTEM ID:  [id number]       Recent Labs   Lab 02/10/22  1248   WBC 11.5*   HGB 13.6   MCV 87         POTASSIUM 4.3   CHLORIDE 110*   CO2 22   BUN 21   CR 0.66   ANIONGAP 5   JARROD 8.6   *       Alisa López PA-C  [facility]  February 10, 2022  Securely message with the Vocera Web Console (learn more here)  Text page via [employer] Paging/Directory

## 2022-02-11 NOTE — PHARMACY-ADMISSION MEDICATION HISTORY
Admission medication history interview status for this patient is complete. See [facility] admission navigator for allergy information, prior to admission medications and immunization status.     Medication history interview done, indicate source(s): daughter Vinayak) @ [phone]  Medication history resources (including written lists, pill bottles, clinic record):None  Ph    Changes made to PTA medication list:  Added: all  Changed: none  Reported as Not Taking: none  Removed: none    Actions taken by pharmacist (provider contacted, etc):None     Additional medication history information:None    Medication reconciliation/reorder completed by provider prior to medication history?  n   (Y/N)     For patients on insulin therapy:   Do you use sliding scale insulin based on blood sugars?   What is your pre-meal insulin coverage?    Do you typically eat three meals a day?   How many times do you check your blood glucose per day?   How many episodes of hypoglycemia do you typically have per month?   Do you have a Continuous Glucose Monitor (CGM)?      Prior to Admission medications    Medication Sig Last Dose Taking? Auth Provider   aspirin 81 MG EC tablet Take 81 mg by mouth daily 2/10/2022 at Unknown time Yes Unknown, Entered By History   atorvastatin (LIPITOR) 40 MG tablet Take 40 mg by mouth At Bedtime 2/9/2022 at Unknown time Yes Unknown, Entered By History   calcium carbonate 600 mg-vitamin D 400 units (CALTRATE) 600-400 MG-UNIT per tablet Take 1 tablet by mouth daily 2/10/2022 at Unknown time Yes Unknown, Entered By History   DULoxetine (CYMBALTA) 60 MG capsule Take 60 mg by mouth At Bedtime 2/9/2022 at Unknown time Yes Unknown, Entered By History   fish oil-omega-3 fatty acids 1000 MG capsule Take 1 g by mouth daily 2/10/2022 at Unknown time Yes Unknown, Entered By History   gabapentin (NEURONTIN) 300 MG capsule Take 300 mg by mouth 3 times daily 2/10/2022 at am Yes Unknown, Entered By History   lisinopril (ZESTRIL)  40 MG tablet Take 40 mg by mouth daily 2/10/2022 at am Yes Unknown, Entered By History   metoprolol succinate ER (TOPROL-XL) 200 MG 24 hr tablet Take 200 mg by mouth daily 2/10/2022 at am Yes Unknown, Entered By History   multivitamin w/minerals (MULTI-VITAMIN) tablet Take 1 tablet by mouth daily For hair and skin 2/10/2022 at Unknown time Yes Unknown, Entered By History   omeprazole (PRILOSEC) 40 MG DR capsule Take 40 mg by mouth daily 2/10/2022 at am Yes Unknown, Entered By History   oxybutynin (DITROPAN) 5 MG tablet Take 5 mg by mouth At Bedtime 2/9/2022 at Unknown time Yes Unknown, Entered By History   tiZANidine (ZANAFLEX) 4 MG tablet Take 4 mg by mouth 2 times daily 2/10/2022 at am Yes Unknown, Entered By History

## 2022-02-11 NOTE — PLAN OF CARE
ROOM # 212-2    Living Situation (if not independent, order SW consult): Home with   Facility name:  : Kellie Raya (daughter)  [phone]    Activity level at baseline: Ind   Activity level on admit: Ax1 pivot    Who will be transporting you at discharge: daughter or     Patient registered to observation; given Patient Bill of Rights; given the opportunity to ask questions about observation status and their plan of care.  Patient has been oriented to the observation room, bathroom and call light is in place.    Discussed discharge goals and expectations with patient/family.

## 2022-02-11 NOTE — PROGRESS NOTES
Ortho progress note    Patient right proximal humerus fracture    XR to ensure not dislocated (axillary view)  Waiting for results of imaging prior to treatment plan    Jie BEAULIEU

## 2022-02-11 NOTE — PLAN OF CARE
PRIMARY DIAGNOSIS: R HUMERUS FX R/T FALL  OUTPATIENT/OBSERVATION GOALS TO BE MET BEFORE DISCHARGE:  1. Pain Status: Improved but still requiring IV and PO narcotics.     2. Return to near baseline physical activity: No    3. Cleared for discharge by consultants (if involved): No    Discharge Planner Nurse   Safe discharge environment identified: Yes  Barriers to discharge: Yes       Entered by: Jade Barreto 02/11/2022 6:29 AM     Vitals are Temp: 98.3  F (36.8  C) Temp src: Oral BP: (!) 153/73 Pulse: 72   Resp: 20 SpO2: 92 %.    Patient is Alert and Oriented x4. Ax1 pivot to BSC.  Pt is a NPO diet.  Complaining of 10/10 pain in her R arm.  Dilaudid and Oxycodone given for pain with relief.  Patient is Saline locked. Voiding. Ortho consulted.

## 2022-02-11 NOTE — PLAN OF CARE
"/75 (BP Location: Left arm)   Pulse 70   Temp 98.4  F (36.9  C) (Oral)   Resp 18   Ht 1.626 m (5' 4\")   SpO2 97%     Pt A&Ox4. VSS. C/o pain in R shoulder. See MAR for pain management. Mildy sedated this AM. Changes made. Ice applied. NPO since MN. Sling in room. No plan for surgery. Ortho consulted. Up A1 with GB. Voiding well. Will cont to monitor.     Jaquelin Cardoza RN    "

## 2022-02-11 NOTE — PROGRESS NOTES
[facility]  Medicine Progress Note - Hospitalist Service       Date of Admission:  2/10/2022  Assessment & Plan   Kristan Trinidad is a 67 year old right-hand dominant female with fibromyalgia, HCOM s/p ICD, HTN, DLD who presented to [facility] ED on 2/10/2022 with right shoulder pain after a mechanical fall on ice while going to lunch to celebrate her birthday.  XR revealed a fracture at the right humerus surgical neck.  Pain intractable.  Admitted for Ortho consult, evaluation by therapies, and pain control.     # Traumatic right proximal humerus fracture  # Mechanical fall on ice  # Intractable pain complicated by sedation  Ortho consulted and await further imaging before can clarify plan:  If displaced, patient will need ORIF, but if not, fracture will be managed conservatively.  Pain control elusive and complicated by underlying inadequate stabilization of fracture (needs a different sling), underlying fibromyalgia and narcotic-induced sedation.   --CT Shoulder ordered.  Ortho to review and make further recs on plan  --Pain regimen adjusted, capno added:    Continue scheduled TID APAP and tizanidine    ADD toradol/ibuprofen    ADD prn hydroxyzine for adjuvant pain    CHANGE PRN narcotic from oxycodone to oral dilaudid    INCREASE PTA gabapentin from 300 TID >> 600 TID    INCREASE dose of PRN IV dilaudid for breakthrough    Maximize ice, heat    If pain seems related more to spasm vs bone pain, will consider changing from tizanidine to valium.  --Bowel regimen added  --PT/OT/SW; with severe pain and fracture to dominant hand, uncertain patient will be able to discharge home    # HCOM s/p ICD  # HTN // DLD  Continue PTA atorvastatin, lisinopril, and Toprol  No indication for tele at this time    # Depression / Fibromyalgia  --Continue PTA duloxetine.  --Gabapentin dose doubled, as above.      Diet: Regular Diet Adult    DVT Prophylaxis: Enoxaparin (Lovenox) SQ  Code Status: Full Code      DISPO:  Anticipate 2-5 more days given difficulty with pain control, inability for PT to see patient due to delay in imaging and therefore unclear surgical plan, and anticipated poor performance with Therapies which will result in TCU recommendation.    VICTOR MANUEL Tirado  Hospitalist Service  [facility]     Text Page (7AM - 5PM, M-F)  ______________________________________________________________________    Interval History   Difficulty with pain control today.  Very sleepy but reporting 10/10 pain.  Arm in sling, appears understabilized.  Ortho requested axillary XR view which X-ray could not perform.  Tried obtaining Y view but this was insufficient to make decision on if humerus dislocated enough to need surgery.  Waiting on CT before Ortho can clarify plan.  And, therefore, PT and OT unable to see.       Data reviewed today: I reviewed all medications, new labs and imaging results over the last 24 hours. I personally reviewed no images or EKG's today.    Physical Exam   Vital Signs: Temp: 97.9  F (36.6  C) Temp src: Oral BP: 126/70 Pulse: 69   Resp: 18 SpO2: 93 % O2 Device: None (Room air)    Weight: 0 lbs 0 oz    GENERAL:  Pleasant but very sedated, sleepy.  NAD  HEENT: Normocephalic, atraumatic.  Extra occular mm intact.  Sclera clear. PERRL.  Mucous membranes tacky  PULMONOLOGY: Clear to auscultation bilaterally anteriorly  CARDIAC: Regular   ABDOMEN: Soft, nontender    MUSCULOSKELETAL:  Moving x 4 spontaneously with CMS intact x4.  Right arm in sling, can wiggle fingers without difficulty.  WWP.    NEURO: Alert and oriented x3.  CN II-XII grossly intact and symmetric.  Nonfocal.       Data   Recent Labs   Lab 02/11/22  0631 02/10/22  1248   WBC 11.2* 11.5*   HGB 13.8 13.6   MCV 89 87    242    137   POTASSIUM 4.1 4.3   CHLORIDE 111* 110*   CO2 24 22   BUN 14 21   CR 0.65 0.66   ANIONGAP 4 5   JARROD 8.7 8.6   * 114*     Recent Results (from the past 24 hour(s))   Humerus  XR, G/E 2 views, right    Narrative    RIGHT HUMERUS TWO OR MORE VIEWS  2/10/2022 12:41 PM    INDICATION: Fall, midhumerus pain.    COMPARISON: None available.      Impression    IMPRESSION: Mildly displaced right proximal humerus fracture centered  at the humeral neck. Mid to distal right humerus appears intact. No  joint malalignment. Lateral downsloping of the right acromion with  mild right acromioclavicular joint osteoarthritis. No sizable elbow  joint effusion.     BENJI CHE MD         SYSTEM ID:  [id number]     Medications       acetaminophen  1,000 mg Oral Q8H     atorvastatin  40 mg Oral At Bedtime     DULoxetine  60 mg Oral At Bedtime     gabapentin  300 mg Oral BID     lisinopril  20 mg Oral Daily     metoprolol succinate ER  200 mg Oral Daily     omeprazole  40 mg Oral Daily     oxybutynin  5 mg Oral At Bedtime     senna-docusate  1 tablet Oral BID    Or     senna-docusate  2 tablet Oral BID     tiZANidine  4-8 mg Oral TID

## 2022-02-11 NOTE — UTILIZATION REVIEW
Admission Status; Secondary Review Determination    Under the authority of the Utilization Management Committee, the utilization review process indicated a secondary review on the above patient. The review outcome is based on review of the medical records, discussions with staff, and applying clinical experience noted on the date of the review.    (x) Inpatient Status Appropriate - This patient's medical care is consistent with medical management for inpatient care and reasonable inpatient medical practice.    RATIONALE FOR DETERMINATION: 67-year-old female with significant history of hypertension, hypertrophic cardiomyopathy status post ICD placement, fibromyalgia who slipped on ice falling and developing acute right arm pain.  Patient found to have a right humeral neck fracture.  Patient received both oral oxycodone as well as three doses of hydromorphone without adequate pain control thus requiring hospital admission.  Patient continues to require frequent doses of intravenous narcotics in addition to intravenous ketorolac for adequate pain control.  Due to the difficult nature of patient's pain control with expectation greater than two nights in the hospital prior to safe discharge, patient appropriate for inpatient care.  This is a conditional review for a Medicare patient, at the time of this review patient is anticipated to require at least 1 more night of hospital care; however, if plan changes and discharges before 2 midnights please send for post discharge review.    At the time of admission with the information available to the attending physician more than 2 nights Hospital complex care was anticipated, based on patient risk of adverse outcome if treated as outpatient and complex care required. Inpatient admission is appropriate based on the Medicare guidelines.    This document was produced using voice recognition software    The information on this document is developed by the utilization review  team in order for the business office to ensure compliance. This only denotes the appropriateness of proper admission status and does not reflect the quality of care rendered.    The definitions of Inpatient Status and Observation Status used in making the determination above are those provided in the CMS Coverage Manual, Chapter 1 and Chapter 6, section 70.4.    Sincerely,    Wilton Fernández MD  Utilization Review  Physician Advisor  [facility].

## 2022-02-12 ENCOUNTER — APPOINTMENT (OUTPATIENT)
Dept: PHYSICAL THERAPY | Facility: CLINIC | Age: 67
DRG: 948 | End: 2022-02-12
Attending: PHYSICIAN ASSISTANT
Payer: MEDICARE

## 2022-02-12 VITALS
OXYGEN SATURATION: 95 % | HEART RATE: 73 BPM | RESPIRATION RATE: 16 BRPM | SYSTOLIC BLOOD PRESSURE: 137 MMHG | HEIGHT: 64 IN | DIASTOLIC BLOOD PRESSURE: 73 MMHG | TEMPERATURE: 98.7 F

## 2022-02-12 PROCEDURE — 250N000013 HC RX MED GY IP 250 OP 250 PS 637: Performed by: PHYSICIAN ASSISTANT

## 2022-02-12 PROCEDURE — 99239 HOSP IP/OBS DSCHRG MGMT >30: CPT | Performed by: PHYSICIAN ASSISTANT

## 2022-02-12 PROCEDURE — 97116 GAIT TRAINING THERAPY: CPT | Mod: GP

## 2022-02-12 PROCEDURE — 97161 PT EVAL LOW COMPLEX 20 MIN: CPT | Mod: GP

## 2022-02-12 PROCEDURE — 258N000003 HC RX IP 258 OP 636: Performed by: PHYSICIAN ASSISTANT

## 2022-02-12 PROCEDURE — 97530 THERAPEUTIC ACTIVITIES: CPT | Mod: GP

## 2022-02-12 RX ORDER — GABAPENTIN 300 MG/1
300 CAPSULE ORAL 3 TIMES DAILY
COMMUNITY
Start: 2022-02-12

## 2022-02-12 RX ORDER — POLYETHYLENE GLYCOL 3350 17 G/17G
17 POWDER, FOR SOLUTION ORAL DAILY PRN
Qty: 510 G | COMMUNITY
Start: 2022-02-12

## 2022-02-12 RX ORDER — HYDROXYZINE HYDROCHLORIDE 25 MG/1
25-50 TABLET, FILM COATED ORAL EVERY 6 HOURS PRN
Qty: 30 TABLET | Refills: 0 | Status: SHIPPED | OUTPATIENT
Start: 2022-02-12

## 2022-02-12 RX ORDER — ACETAMINOPHEN 500 MG
1000 TABLET ORAL EVERY 8 HOURS
COMMUNITY
Start: 2022-02-12

## 2022-02-12 RX ORDER — GABAPENTIN 300 MG/1
300 CAPSULE ORAL 3 TIMES DAILY
Status: DISCONTINUED | OUTPATIENT
Start: 2022-02-12 | End: 2022-02-12 | Stop reason: HOSPADM

## 2022-02-12 RX ORDER — AMOXICILLIN 250 MG
2 CAPSULE ORAL 2 TIMES DAILY PRN
COMMUNITY
Start: 2022-02-12

## 2022-02-12 RX ORDER — IBUPROFEN 600 MG/1
600 TABLET, FILM COATED ORAL 4 TIMES DAILY PRN
COMMUNITY
Start: 2022-02-12 | End: 2022-02-19

## 2022-02-12 RX ORDER — HYDROMORPHONE HYDROCHLORIDE 2 MG/1
2-4 TABLET ORAL EVERY 6 HOURS PRN
Qty: 20 TABLET | Refills: 0 | Status: SHIPPED | OUTPATIENT
Start: 2022-02-12

## 2022-02-12 RX ADMIN — HYDROMORPHONE HYDROCHLORIDE 4 MG: 2 TABLET ORAL at 03:54

## 2022-02-12 RX ADMIN — HYDROMORPHONE HYDROCHLORIDE 4 MG: 2 TABLET ORAL at 08:28

## 2022-02-12 RX ADMIN — ACETAMINOPHEN 1000 MG: 500 TABLET, FILM COATED ORAL at 11:37

## 2022-02-12 RX ADMIN — TIZANIDINE 8 MG: 4 TABLET ORAL at 08:28

## 2022-02-12 RX ADMIN — SENNOSIDES AND DOCUSATE SODIUM 2 TABLET: 50; 8.6 TABLET ORAL at 08:28

## 2022-02-12 RX ADMIN — HYDROMORPHONE HYDROCHLORIDE 4 MG: 2 TABLET ORAL at 11:37

## 2022-02-12 RX ADMIN — METOPROLOL SUCCINATE 200 MG: 100 TABLET, EXTENDED RELEASE ORAL at 08:28

## 2022-02-12 RX ADMIN — ACETAMINOPHEN 1000 MG: 500 TABLET, FILM COATED ORAL at 03:54

## 2022-02-12 RX ADMIN — LISINOPRIL 20 MG: 20 TABLET ORAL at 08:28

## 2022-02-12 RX ADMIN — HYDROXYZINE HYDROCHLORIDE 50 MG: 50 TABLET ORAL at 11:48

## 2022-02-12 RX ADMIN — OMEPRAZOLE 40 MG: 20 CAPSULE, DELAYED RELEASE ORAL at 08:28

## 2022-02-12 RX ADMIN — SODIUM CHLORIDE, POTASSIUM CHLORIDE, SODIUM LACTATE AND CALCIUM CHLORIDE: 600; 310; 30; 20 INJECTION, SOLUTION INTRAVENOUS at 05:25

## 2022-02-12 ASSESSMENT — ACTIVITIES OF DAILY LIVING (ADL)
ADLS_ACUITY_SCORE: 17
ADLS_ACUITY_SCORE: 15
ADLS_ACUITY_SCORE: 17
ADLS_ACUITY_SCORE: 15

## 2022-02-12 NOTE — PLAN OF CARE
Shoulder immobilizer placed.  Patient given instructions regarding the care of the immobilizer, skin care underneath the immobilizer and how to adjust if necessary.

## 2022-02-12 NOTE — PROGRESS NOTES
Patient's After Visit Summary was reviewed with patient.   Patient verbalized understanding of After Visit Summary, recommended follow up and was given an opportunity to ask questions.   Discharge medications sent home with patient/family: YES   Discharged with daughter

## 2022-02-12 NOTE — PROGRESS NOTES
02/12/22 0900   Quick Adds   Type of Visit Initial PT Evaluation   Living Environment   People in home spouse;child(lily), adult  (daughter)   Current Living Arrangements house   Home Accessibility stairs to enter home   Number of Stairs, Main Entrance 5   Stair Railings, Main Entrance railings on both sides of stairs   Transportation Anticipated family or friend will provide   Living Environment Comments Pt reports her spouse and daughter can assist as needed on discharge.    Self-Care   Usual Activity Tolerance good   Current Activity Tolerance fair   Equipment Currently Used at Home none   Disability/Function   Fall history within last six months yes   Number of times patient has fallen within last six months 1   General Information   Onset of Illness/Injury or Date of Surgery 02/10/22   Referring Physician Brianna Peralta PA   Patient/Family Therapy Goals Statement (PT) Return home, ASAP   Pertinent History of Current Problem (include personal factors and/or comorbidities that impact the POC) Right proximal humerus fracture, minimal displacement   Existing Precautions/Restrictions fall   Weight-Bearing Status - RUE   (NWB; no lifting anything greater than a coffee cup)   Cognition   Orientation Status (Cognition) oriented x 4   Pain Assessment   Patient Currently in Pain Yes, see Vital Sign flowsheet  (5-6/10 )   Integumentary/Edema   Integumentary/Edema Comments Patient with R UE sling donned throughout session.   Range of Motion (ROM)   ROM Comment  L UE and B LE AROM appear WFLs   Transfers   Transfers sit-stand transfer   Sit-Stand Transfer   Sit-Stand Jones (Transfers) contact guard   Sit/Stand Transfer Comments From EOB and toilet   Gait/Stairs (Locomotion)   Comment (Gait/Stairs) Ambulates 20' and 80' with no AD, CGA progressing to supervision. Slow gait speed. Mild unsteadiness, but pt able to self correct.    Clinical Impression   Criteria for Skilled Therapeutic Intervention yes,  treatment indicated   PT Diagnosis (PT) Impaired functional mobility   Influenced by the following impairments Impaired balance, decreased activity tolerance   Functional limitations due to impairments Decreased IND with transfers, gait and stairs   Clinical Presentation Stable/Uncomplicated   Clinical Presentation Rationale Medically improving; pain under better control    Clinical Decision Making (Complexity) low complexity   Therapy Frequency (PT) Daily   Predicted Duration of Therapy Intervention (days/wks) 3 days   Planned Therapy Interventions (PT) balance training;gait training;stair training;transfer training;progressive activity/exercise;patient/family education   Risk & Benefits of therapy have been explained evaluation/treatment results reviewed;care plan/treatment goals reviewed;participants voiced agreement with care plan;participants included;patient   PT Discharge Planning    PT Discharge Recommendation (DC Rec) home with assist;home with home care physical therapy   PT Rationale for DC Rec Patient below baseline in terms of balance and activity tolerance. Recommend home with A for household tasks, physical assist on the stairs. Recommend HHPT to maximize IND/safety in the home environment.    Total Evaluation Time   Total Evaluation Time (Minutes) 5

## 2022-02-12 NOTE — PLAN OF CARE
Occupational Therapy Discharge Summary    Reason for therapy discharge:    Discharged to home with home therapy.    Progress towards therapy goal(s). See goals on Care Plan in [facility] electronic health record for goal details.  Goals partially met.  Barriers to achieving goals:   discharge from facility.    Therapy recommendation(s):    Continued therapy is recommended.  Rationale/Recommendations:  Recommend continued HHOT to return to PLOF.

## 2022-02-12 NOTE — CONSULTS
Care Management Discharge Note    Discharge Date: 02/12/2022     Discharge Disposition:  Home    Discharge Services:  Home Care RN/PT/OT    Discharge DME:  Sling    Discharge Transportation: family or friend will provide    Private pay costs discussed: Not applicable    Patient/family educated on Medicare website which has current facility and service quality ratings:  Yes    Education Provided on the Discharge Plan:  Yes  Persons Notified of Discharge Plans: Patient  Patient/Family in Agreement with the Plan:  Yes    Handoff Referral Completed: No    Additional Information:  SW met with patient briefly on 2/11 after OT had recommended TCU. Patient was lethargic and fell asleep during conversation but was easily aroused. At the time she was agreeable to TCU placement and was okay with SW sending referrals near [place] for TCU. Referrals were sent to [facility], [facility], [facility], and [facility].     Today PT evaluated and recommended home with HC. Patient discharge orders to return home with Home RN/PT/OT. SW reviewed Medicare.gov website for Home Health agencies. Called [facility] ([phone]), VM left. Called [facility] ([phone]), left , nobody in intake until Monday. SW placed call to [facility], [phone], spoke with intake. Initial referral faxed to (f) [phone]. They can do a start of care on Tuesday 2/15. AVS updated. Discharge orders faxed to [facility]. Family to transport. SW will remain available for any further needs.     JACKELIN Peck, [facility]   Inpatient Care Coordination  [facility]   [phone]

## 2022-02-12 NOTE — DISCHARGE INSTRUCTIONS
Your home care referral was sent to  [facility]  If you haven't heard from them within the next 24-48 hours,  Please call them at [phone]. They can do a start of care for   your home care services on Tuesday, 2/15.

## 2022-02-12 NOTE — PLAN OF CARE
"Blood pressure 114/73, pulse 70, temperature 97.7  F (36.5  C), temperature source Oral, resp. rate 20, height 1.626 m (5' 4\"), SpO2 97 %.      Neuro:   Patient appears somewhat lethargic; however orientated x 4.    Pulm:     LS clear, adequate sats on room air.  CV:        WDL  GI:         Tolerating evening meal tray without nausea or emesis.  :        WDL  Activity:  Patient is a standby assist; ambulating to the bathroom prn,  Pain:     Patient c/o of severe pain rating her pain a 10 on a 1-10 scale; however does not display severe pain by body language; intermittently falling asleep between cares.  Dilaudid po given in addition to toradol  and atarax.    Plan:      Continue to provide adequate pain control while attempting to use less non narcotic medications   "

## 2022-02-12 NOTE — PLAN OF CARE
"  INPATIENT NOTE: [phone]    PRIMARY PROBLEM: Fracture of surgical neck of right humerus      /77 (BP Location: Left arm)   Pulse 70   Temp 98.4  F (36.9  C) (Oral)   Resp 18   Ht 1.626 m (5' 4\")   SpO2 94%        Orientation: Alert and Oriented x4  Neuro: Intact   Pain status: Pain is well managed with scheduled medication and PRN Toradol.  Resp: Lung sounds are Clear. Denies any SOB.   Cardiac: WNL, Denies any Chest Pain   GI: Bowels are active x 4 Quads. Denies any constipation.  : Voiding with no concern    Skin: WNL   LDA: Peripheral IV   Infusions: Patient has Lactated Ringer's running at 125 mL per hour.   Pertinent Labs: WBC 11.2  Diet: Regular  Activity: SBA   Alarms/Safety: Bed Alarm  Consults: PT, OT, and Social Work or Care Coordination   Discharge Plan: PT is recommending Transitional Care Unit      Will continue to monitor and provide cares.     Radha Bocanegra RN        "

## 2022-02-12 NOTE — DISCHARGE SUMMARY
[facility]  Hospitalist Discharge Summary      Date of Admission:  2/10/2022  Date of Discharge:  2/12/2022 12:31 PM  Discharging Provider: Elaine Walden PA-C  Discharge Service: Hospitalist Service    Discharge Diagnoses   Fall on ice  R proximal humerus fx    Follow-ups Needed After Discharge   Follow-up Appointments     Follow-up and recommended labs and tests       Follow up with primary care provider, YONIS DAS, within 7 days   for hospital follow- up.  No follow up labs or test are needed.  Follow-up with shoulder specialist at [facility] in 2-4 weeks. [phone]             Unresulted Labs Ordered in the Past 30 Days of this Admission     No orders found from 1/11/2022 to 2/11/2022.      These results will be followed up by PCP    Discharge Disposition   Discharged to home  Condition at discharge: Stable      Hospital Course   Kristan Trinidad is a 67 year old right-hand dominant female with fibromyalgia, HCOM s/p ICD, HTN, DLD who presented to [facility] ED on 2/10/2022 with right shoulder pain after a mechanical fall on ice while going to lunch to celebrate her birthday. XR revealed a fracture at the right humerus surgical neck.  Pain intractable.  Admitted for Ortho consult, evaluation by therapies, and pain control. Ortho was consulted, CT of the R shoulder was obtained. Ortho recommended non operative management with sling and pain control. Pain control was initially difficult. Pain regimen was adjusted and pain improved. PT consulted and initially recommended TCU but mobility improved as pain control improved and pt was able to discharge home. She will continue sling and follow up with Ortho in 2-4 weeks. Home Care nurse/PT/OT ordered on discharge.    Covid-19 negative    Consultations This Hospital Stay   OCCUPATIONAL THERAPY ADULT IP CONSULT  ORTHOPEDIC SURGERY IP CONSULT  PHYSICAL THERAPY ADULT IP CONSULT  ORTHOSIS BRACE IP CONSULT  CARE MANAGEMENT / SOCIAL WORK IP  CONSULT    Code Status   Full Code    Time Spent on this Encounter   I, Elaine Walden PA-C, personally saw the patient today and spent greater than 30 minutes discharging this patient.       Elaine Walden PA-C  [facility] OBSERVATION DEPT  [address] [place] [address]  Phone: [phone]  ______________________________________________________________________    Physical Exam   Vital Signs: Temp: 98.7  F (37.1  C) Temp src: Axillary BP: 137/73 Pulse: 73   Resp: 16 SpO2: 95 % O2 Device: Nasal cannula    Weight: 0 lbs 0 oz    GENERAL:  Comfortable.  PSYCH: pleasant, oriented, No acute distress.  HEART:  RRR  LUNGS:  Normal Respiratory effort.  EXTREMITIES:  CMS intact in R UE.  SKIN:  Dry to touch, No rash, wound or ulcerations.  NEUROLOGIC:  Grossly intact        Primary Care Physician   YONIS DAS    Discharge Orders      Home care nursing referral      Home Care PT Referral for Hospital Discharge      Home Care OT Referral for Hospital Discharge      Reason for your hospital stay    R proximal humerus fracture from slip and fall on the ice. Orthopedic surgery was consulted and recommended non-operative management with sling. Pain control was an issue initially but did improve with a different style sling and adjustment of pain regimen. PT consulted and initially recommended TCU but with improved pain control, now feel home with Home Care Services is appropriate.     Follow-up and recommended labs and tests     Follow up with primary care provider, YONIS DAS, within 7 days for hospital follow- up.  No follow up labs or test are needed.  Follow-up with shoulder specialist at [facility] in 2-4 weeks. [phone]     Activity    Your activity upon discharge: activity as tolerated, wear sling when up out of bed, non weightbearing to right upper extremity.     When to contact your care team    Call your primary doctor if you have any of the following: temperature  greater than 101, increased swelling, or increased pain.     MD face to face encounter    Documentation of Face to Face and Certification for Home Health Services    I certify that patient: Kristan Trinidad is under my care and that I, or a nurse practitioner or physician's assistant working with me, had a face-to-face encounter that meets the physician face-to-face encounter requirements with this patient on: 2/12/2022.    This encounter with the patient was in whole, or in part, for the following medical condition, which is the primary reason for home health care: R prox humerus fx.    I certify that, based on my findings, the following services are medically necessary home health services: Nursing, Occupational Therapy, and Physical Therapy.    My clinical findings support the need for the above services because: Nurse is needed: To assess pain control and mobility after changes in medications or other medical regimen.., Occupational Therapy Services are needed to assess and treat cognitive ability and address ADL safety due to impairment in mobility due to R humerus fx., and Physical Therapy Services are needed to assess and treat the following functional impairments: R humerus fx.    Further, I certify that my clinical findings support that this patient is homebound (i.e. absences from home require considerable and taxing effort and are for medical reasons or Yaz[employer] services or infrequently or of short duration when for other reasons) because: Requires assistance of another person or specialized equipment to access medical services because patient: Has prohibitive pain during ambulation., Is unable to exit home safely on own due to: R prox humerus fx., Is unable to operate assistive equipment on their own., and Requires supervision of another for safe transfer...    Based on the above findings. I certify that this patient is confined to the home and needs intermittent skilled nursing care, physical therapy  and/or speech therapy.  The patient is under my care, and I have initiated the establishment of the plan of care.  This patient will be followed by a physician who will periodically review the plan of care.  Physician/Provider to provide follow up care: Dru Cornejo    Attending hospital physician (the Medicare certified [place] provider): Elaine Walden PA-C  Physician Signature: See electronic signature associated with these discharge orders.  Date: 2/12/2022     Diet    Follow this diet upon discharge: Regular       Significant Results and Procedures   Most Recent 3 CBC's:Recent Labs   Lab Test 02/11/22  0631 02/10/22  1248   WBC 11.2* 11.5*   HGB 13.8 13.6   MCV 89 87    242     Most Recent 3 BMP's:Recent Labs   Lab Test 02/11/22  0631 02/10/22  1248    137   POTASSIUM 4.1 4.3   CHLORIDE 111* 110*   CO2 24 22   BUN 14 21   CR 0.65 0.66   ANIONGAP 4 5   JARROD 8.7 8.6   * 114*     Most Recent 2 LFT's:Recent Labs   Lab Test 02/11/22  0631   AST 16   ALT 24   ALKPHOS 61   BILITOTAL 0.4   ,   Results for orders placed or performed during the hospital encounter of 02/10/22   Humerus XR, G/E 2 views, right    Narrative    RIGHT HUMERUS TWO OR MORE VIEWS  2/10/2022 12:41 PM    INDICATION: Fall, midhumerus pain.    COMPARISON: None available.      Impression    IMPRESSION: Mildly displaced right proximal humerus fracture centered  at the humeral neck. Mid to distal right humerus appears intact. No  joint malalignment. Lateral downsloping of the right acromion with  mild right acromioclavicular joint osteoarthritis. No sizable elbow  joint effusion.     BENJI CHE MD         SYSTEM ID:  [id number]   XR Shoulder Right 1 View    Narrative    SHOULDER RIGHT ONE VIEW  DATE/TIME: 2/11/2022 10:00 AM     INDICATION: Right-sided shoulder pain. Concern for dislocation.   COMPARISON: 2/10/2022.      Impression    IMPRESSION:  1.  Normal right glenohumeral alignment on this single axillary view.  2.   Proximal right humerus fracture.  3.  Cardiac pacer leads.    ELEANOR RODRIGUEZ MD         SYSTEM ID:  [id number]   CT Shoulder Right w/o Contrast    Narrative    CT SHOULDER RIGHT WITHOUT CONTRAST 2/11/2022 11:51 AM    INDICATION: Right proximal humerus fracture.  COMPARISON: Right humerus radiographic exam 2/10/2022. Right shoulder  radiographic exam 2/11/2022  TECHNIQUE: Noncontrast. Axial, sagittal and coronal thin-section  reconstruction. Dose reduction techniques were used.   CONTRAST: None.    FINDINGS:   Redemonstrated is a mildly displaced right proximal humerus fracture  centered at the humeral neck. Fracture line extension into the greater  tuberosity. No significant elevation or medial rotation of the greater  tuberosity. No glenohumeral joint dislocation. Mild right  acromioclavicular joint osteoarthritis. Diffuse bone demineralization.  No concerning bone lesion. No clavicle or scapula fracture identified.  No displaced right-sided rib fracture is seen. Leads of cardiac  pacemaker/defibrillator noted. Right shoulder soft tissue swelling  about the fracture. No sizable joint effusion. No axillary adenopathy.    Type I acromion. Intact-appearing coracoacromial and coracoclavicular  ligaments. Largely preserved rotator cuff muscle bulk. Deltoid muscle  bulk is normal. Degenerative change lower cervical spine. Visualized  right lung remarkable for dependent atelectasis. Mosaic attenuation in  the right lung.      Impression    IMPRESSION:  1.  Mildly displaced comminuted right proximal humerus fracture.  2.  No glenohumeral joint dislocation.      BENJI CHE MD         SYSTEM ID:  [id number]       Discharge Medications   Current Discharge Medication List      START taking these medications    Details   acetaminophen (TYLENOL) 500 MG tablet Take 2 tablets (1,000 mg) by mouth every 8 hours    Associated Diagnoses: Closed fracture of proximal end of right humerus, unspecified fracture morphology, initial  encounter      HYDROmorphone (DILAUDID) 2 MG tablet Take 1-2 tablets (2-4 mg) by mouth every 6 hours as needed for severe pain  Qty: 20 tablet, Refills: 0    Associated Diagnoses: Closed fracture of proximal end of right humerus, unspecified fracture morphology, initial encounter      hydrOXYzine (ATARAX) 25 MG tablet Take 1-2 tablets (25-50 mg) by mouth every 6 hours as needed for other (adjuvant pain)  Qty: 30 tablet, Refills: 0    Associated Diagnoses: Closed fracture of proximal end of right humerus, unspecified fracture morphology, initial encounter      ibuprofen (ADVIL/MOTRIN) 600 MG tablet Take 1 tablet (600 mg) by mouth 4 times daily as needed for moderate pain or fever    Associated Diagnoses: Closed fracture of proximal end of right humerus, unspecified fracture morphology, initial encounter      polyethylene glycol (MIRALAX) 17 GM/Dose powder Take 17 g by mouth daily as needed for constipation  Qty: 510 g    Associated Diagnoses: Closed fracture of proximal end of right humerus, unspecified fracture morphology, initial encounter      senna-docusate (SENOKOT-S/PERICOLACE) 8.6-50 MG tablet Take 2 tablets by mouth 2 times daily as needed for constipation    Associated Diagnoses: Closed fracture of proximal end of right humerus, unspecified fracture morphology, initial encounter         CONTINUE these medications which have CHANGED    Details   gabapentin (NEURONTIN) 300 MG capsule Take 1 capsule (300 mg) by mouth 3 times daily      tiZANidine (ZANAFLEX) 4 MG tablet Take 1 tablet (4 mg) by mouth 3 times daily as needed for muscle spasms         CONTINUE these medications which have NOT CHANGED    Details   aspirin 81 MG EC tablet Take 81 mg by mouth daily      atorvastatin (LIPITOR) 40 MG tablet Take 40 mg by mouth At Bedtime      calcium carbonate 600 mg-vitamin D 400 units (CALTRATE) 600-400 MG-UNIT per tablet Take 1 tablet by mouth daily      DULoxetine (CYMBALTA) 60 MG capsule Take 60 mg by mouth At  Bedtime      fish oil-omega-3 fatty acids 1000 MG capsule Take 1 g by mouth daily      lisinopril (ZESTRIL) 40 MG tablet Take 20 mg by mouth daily       metoprolol succinate ER (TOPROL-XL) 200 MG 24 hr tablet Take 200 mg by mouth daily      multivitamin w/minerals (MULTI-VITAMIN) tablet Take 1 tablet by mouth daily For hair and skin      omeprazole (PRILOSEC) 40 MG DR capsule Take 40 mg by mouth daily      oxybutynin (DITROPAN) 5 MG tablet Take 5 mg by mouth At Bedtime           Allergies   Allergies   Allergen Reactions     Codeine Nausea and Vomiting     Fentanyl Hives

## 2022-02-12 NOTE — PLAN OF CARE
Physical Therapy Discharge Summary    Reason for therapy discharge:    Discharged to home with home therapy.    Progress towards therapy goal(s). See goals on Care Plan in [facility] electronic health record for goal details.  Goals not met.  Barriers to achieving goals:   discharge from facility.    Therapy recommendation(s):    Patient below baseline in terms of balance and activity tolerance. Recommend home with A for household tasks, A for self cares, physical assist on the stairs. Recommend HHPT to maximize IND/safety in the home environment.

## 2022-02-12 NOTE — PLAN OF CARE
PRIMARY PROBLEM: Fracture of surgical neck of right humerus       Orientation: Alert and Oriented, forgetful.  Neuro: Intact   Pain status: Pain is well managed with scheduled medication and PRN.  Resp: Lung sounds are Clear. Denies any SOB.   Cardiac: WNL, Denies any Chest Pain   GI: WDL  : Voiding with no concern    Skin: WNL   LDA: Peripheral IV   Infusions: SL  Pertinent Labs: WBC 11.2  Diet: Regular  Activity: SBA NWB RLE  Alarms/Safety: Bed Alarm  Consults: PT, OT, and Social Work   Discharge Plan: Patient declining TCU. Home with home PT.